# Patient Record
(demographics unavailable — no encounter records)

---

## 2024-12-12 NOTE — PLAN
[FreeTextEntry1] :   #HM  -CMP, CBC w. diff, Lipid, A1C, TSH w. rFT4, vitamin B12 level -Flu shot annually recommended -COVID-19 vaccination series and booster recommended -Tdap q10 years recommended -Use of sunscreen -Physical activity 3-5x/week for at least 30 minutes recommended -Healthy diet and lifestyle recommended including diet low in processed foods and saturated fats and high in vegetables and whole grains -Dermatology evaluation for skin cancer screening -Annual dental and vision exam recommended  Labs drawn in office

## 2024-12-12 NOTE — HEALTH RISK ASSESSMENT
[Yes] : Yes [2 - 4 times a month (2 pts)] : 2-4 times a month (2 points) [1 or 2 (0 pts)] : 1 or 2 (0 points) [Never (0 pts)] : Never (0 points) [No] : In the past 12 months have you used drugs other than those required for medical reasons? No [0] : 2) Feeling down, depressed, or hopeless: Not at all (0) [PHQ-2 Negative - No further assessment needed] : PHQ-2 Negative - No further assessment needed [Never] : Never [Patient reported mammogram was normal] : Patient reported mammogram was normal [Patient reported PAP Smear was normal] : Patient reported PAP Smear was normal [HIV test declined] : HIV test declined [Hepatitis C test declined] : Hepatitis C test declined [None] : None [Fully functional (bathing, dressing, toileting, transferring, walking, feeding)] : Fully functional (bathing, dressing, toileting, transferring, walking, feeding) [Fully functional (using the telephone, shopping, preparing meals, housekeeping, doing laundry, using] : Fully functional and needs no help or supervision to perform IADLs (using the telephone, shopping, preparing meals, housekeeping, doing laundry, using transportation, managing medications and managing finances) [Audit-CScore] : 2 [[id number]] : 0 [Change in mental status noted] : No change in mental status noted [MammogramDate] : 09/24 [PapSmearDate] : 2024

## 2025-01-10 NOTE — PHYSICAL EXAM
[de-identified] : General: patient is well developed, well nourished, in no acute  distress, alert and oriented x 3.   Mood and affect: normal  Respiratory: no respiratory distress noted  Skin: no scars over spine, skin intact, no erythema, increased warmth  Alignment:The spine is well compensated in the coronal and sagittal plane.   Gait: The patient is able to toe walk and heel walk without difficulty.   Palpation: no tenderness to palpation spine or paraspinal region  Range of motion: Lumbar spine ROM is restricted  Neurologic Exam: Motor: 2/5 right peroneal; 2/5 right EHL; 3/5 right hip abductors; 4/5 right TA, otherwise Manual Muscle testing in the lower extremities is 5 out of 5 in all muscle groups. There is no evidence of muscular atrophy in the lower extremities. Sensory: decreased sensation right L5 distribution, otherwise Sensation to light touch is grossly intact in the lower extremities  Reflexes: DTR are present and symmetric throughout, negative negrete bilaterally, negative inverted radial reflex bilaterally, no clonus, plantar responses are flexor  Hip Exam: No pain with internal or external rotation of hips bilaterally  Special tests: Straight leg raise test positive on the right.  Cross straight leg test negative.   [de-identified] : Xray 12/27/24: my read: moderate disc height loss L5/S1, mild L4/5; no instability; no fractures seen  MRI 12/28/24: my read: large extruded disc herniation paracentrally L4/5 on right with severe compression of the right thecal sac and obliteration of the right lateral recess with severe compression of the right L5 root;  L5/S1 small central disc herniation without significant neurocompressive lesion at this level

## 2025-01-10 NOTE — DISCUSSION/SUMMARY
[de-identified] : Severe weakness of right peroneal, right EHL, right hip abductor, right L5 radiculopathy in setting of L4/5 large herniated disc with severe lateral recess stenosis and severe right L5 root compression  The patient is a candidate for L4/5 microdiscectomy.  Due to severe weakness associated with this disc herniation, recommend operative treatment.   Further non operative treatment would include pain management.  Risks, benefits, alternatives were discussed with the patient at great length, including but not limited to, bleeding, infection, persistent neurologic deficit, worsening pain, worsening neurologic status, dural tear, hardware migration/failure, medical complications (including but not limited to cardiac, pulmonary, renal), and the need for further surgery.  We discussed that there is no guarantee of return to pain free status or normal neurologic function.  The patient asked a number of cogent questions.  All questions were answered.  The patient demonstrated understanding of the risks, benefits, and alternatives.  The patient has elected to proceed with surgery.

## 2025-01-10 NOTE — HISTORY OF PRESENT ILLNESS
[de-identified] : Referred by Dr. Vamshi Crockett  Ms. MARADIAGA is a very pleasant 40 year old female who complains of a long-standing history of low back and right buttock pain, worse over the past month, atraumatic onset. Pain radiates laterally down her right lower extremity to her foot. Reports numbness/tingling in right foot, worst in big toe. Worst pain is in right calf. Has been seen in ED. Has taken muscle relaxants and percocet. Now taking gabapentin and meloxicam. Daily activities significantly affected by symptoms.   The patient no history of previous spine surgery.   The patient has no history of unexpected weight loss, no history of active cancer, no history bladder or bowel dysfunction, no night pain, no fevers or chills.   The past medical history, surgical history, family history, allergies, medications, 10+ point review of systems, family history and social history were reviewed and non contributory.

## 2025-01-21 NOTE — ADDENDUM
[FreeTextEntry1] :  Pt states dyspnea resolved and attributes to anxiety regarding her back pain which she has states has worsened.  Urine cx- klebsiella- 50-99k Bactrim DS BID x 5 days recommended.

## 2025-01-21 NOTE — PHYSICAL EXAM
[No Acute Distress] : no acute distress [Well Nourished] : well nourished [Well Developed] : well developed [Well-Appearing] : well-appearing [Normal Sclera/Conjunctiva] : normal sclera/conjunctiva [PERRL] : pupils equal round and reactive to light [EOMI] : extraocular movements intact [Normal Outer Ear/Nose] : the outer ears and nose were normal in appearance [Normal Oropharynx] : the oropharynx was normal [No JVD] : no jugular venous distention [No Lymphadenopathy] : no lymphadenopathy [Supple] : supple [Thyroid Normal, No Nodules] : the thyroid was normal and there were no nodules present [No Respiratory Distress] : no respiratory distress  [No Accessory Muscle Use] : no accessory muscle use [Clear to Auscultation] : lungs were clear to auscultation bilaterally [Normal Rate] : normal rate  [Regular Rhythm] : with a regular rhythm [Normal S1, S2] : normal S1 and S2 [No Murmur] : no murmur heard [No Abdominal Bruit] : a ~M bruit was not heard ~T in the abdomen [No Varicosities] : no varicosities [Pedal Pulses Present] : the pedal pulses are present [No Edema] : there was no peripheral edema [No Palpable Aorta] : no palpable aorta [No Extremity Clubbing/Cyanosis] : no extremity clubbing/cyanosis [Soft] : abdomen soft [Non Tender] : non-tender [Non-distended] : non-distended [No Masses] : no abdominal mass palpated [No HSM] : no HSM [Normal Bowel Sounds] : normal bowel sounds [Normal Posterior Cervical Nodes] : no posterior cervical lymphadenopathy [Normal Anterior Cervical Nodes] : no anterior cervical lymphadenopathy [No CVA Tenderness] : no CVA  tenderness [No Spinal Tenderness] : no spinal tenderness [No Joint Swelling] : no joint swelling [Grossly Normal Strength/Tone] : grossly normal strength/tone [No Rash] : no rash [Coordination Grossly Intact] : coordination grossly intact [No Focal Deficits] : no focal deficits [Normal Affect] : the affect was normal [Normal Insight/Judgement] : insight and judgment were intact [de-identified] : dense breasts, nipple tenderness b/l [de-identified] : decreased sensation of right foot proximal to toes , right ankle weakness

## 2025-01-21 NOTE — PHYSICAL EXAM
[No Acute Distress] : no acute distress [Well Nourished] : well nourished [Well Developed] : well developed [Well-Appearing] : well-appearing [Normal Sclera/Conjunctiva] : normal sclera/conjunctiva [PERRL] : pupils equal round and reactive to light [EOMI] : extraocular movements intact [Normal Outer Ear/Nose] : the outer ears and nose were normal in appearance [Normal Oropharynx] : the oropharynx was normal [No JVD] : no jugular venous distention [No Lymphadenopathy] : no lymphadenopathy [Supple] : supple [Thyroid Normal, No Nodules] : the thyroid was normal and there were no nodules present [No Respiratory Distress] : no respiratory distress  [No Accessory Muscle Use] : no accessory muscle use [Clear to Auscultation] : lungs were clear to auscultation bilaterally [Normal Rate] : normal rate  [Regular Rhythm] : with a regular rhythm [Normal S1, S2] : normal S1 and S2 [No Murmur] : no murmur heard [No Abdominal Bruit] : a ~M bruit was not heard ~T in the abdomen [No Varicosities] : no varicosities [Pedal Pulses Present] : the pedal pulses are present [No Edema] : there was no peripheral edema [No Palpable Aorta] : no palpable aorta [No Extremity Clubbing/Cyanosis] : no extremity clubbing/cyanosis [Soft] : abdomen soft [Non Tender] : non-tender [Non-distended] : non-distended [No Masses] : no abdominal mass palpated [No HSM] : no HSM [Normal Bowel Sounds] : normal bowel sounds [Normal Posterior Cervical Nodes] : no posterior cervical lymphadenopathy [Normal Anterior Cervical Nodes] : no anterior cervical lymphadenopathy [No CVA Tenderness] : no CVA  tenderness [No Spinal Tenderness] : no spinal tenderness [No Joint Swelling] : no joint swelling [Grossly Normal Strength/Tone] : grossly normal strength/tone [No Rash] : no rash [Coordination Grossly Intact] : coordination grossly intact [No Focal Deficits] : no focal deficits [Normal Affect] : the affect was normal [Normal Insight/Judgement] : insight and judgment were intact [de-identified] : dense breasts, nipple tenderness b/l [de-identified] : decreased sensation of right foot proximal to toes , right ankle weakness

## 2025-01-21 NOTE — HISTORY OF PRESENT ILLNESS
[Excellent (>10 METs)] : Excellent (>10 METs) [Aortic Stenosis] : no aortic stenosis [Atrial Fibrillation] : no atrial fibrillation [Coronary Artery Disease] : no coronary artery disease [Recent Myocardial Infarction] : no recent myocardial infarction [Implantable Device/Pacemaker] : no implantable device/pacemaker [Asthma] : no asthma [COPD] : no COPD [Sleep Apnea] : no sleep apnea [Smoker] : not a smoker [Family Member] : no family member with adverse anesthesia reaction/sudden death [Self] : no previous adverse anesthesia reaction [Chronic Anticoagulation] : no chronic anticoagulation [Chronic Kidney Disease] : no chronic kidney disease [Diabetes] : no diabetes [FreeTextEntry1] : lumbar discectomy  [FreeTextEntry2] : 01/23/2025  [FreeTextEntry3] : Dr. Willie Hunter  [FreeTextEntry4] : 39 y/o female presents for preoperative examination prior to lumbar discectomy. She reports low back pain with right leg neuropathy. Pt reports dyspnea which she is unsure if related to anxiety and stress of back injury. No chest pain. No urinary/fecal incontinence and no saddle anesthesia. Patient states severe back pain was triggered on 12/16 when she sneezed. No recent fall or trauma. She went to [facility] and was given Percocet. Patient states she went to urgent care on 12/21 and had completed MRI.  She initially consulted with pain management on 12/27 and orthopedic surgeon Dr. Beck ([facility]) on 01/03/25. She had labs done at [facility] on 01/03. She sought a second opinion with orthopedic Dr. Willie Hunter on 01/10/2025.  She reports taking macrobid x 5 days and completed about one week ago for UTI.  LMP- 12/23/2024  [FreeTextEntry6] : dyspnea

## 2025-01-21 NOTE — ASSESSMENT
[High Risk Surgery - Intraperitoneal, Intrathoracic or Supringuinal Vascular Procedures] : High Risk Surgery - Intraperitoneal, Intrathoracic or Supringuinal Vascular Procedures - No (0) [Ischemic Heart Disease] : Ischemic Heart Disease - No (0) [Congestive Heart Failure] : Congestive Heart Failure - No (0) [Prior Cerebrovascular Accident or TIA] : Prior Cerebrovascular Accident or TIA - No (0) [Creatinine >= 2mg/dL (1 Point)] : Creatinine >= 2mg/dL - No (0) [Insulin-dependent Diabetic (1 Point)] : Insulin-dependent Diabetic - No (0) [0] : 0 , RCRI Class: I, Risk of Post-Op Cardiac Complications: 3.9%, 95% CI for Risk Estimate: 2.8% - 5.4% [Patient Optimized for Surgery] : Patient optimized for surgery [As per surgery] : as per surgery [FreeTextEntry4] : 39 y/o female presents for preoperative examination.

## 2025-01-21 NOTE — PHYSICAL EXAM
[No Acute Distress] : no acute distress [Well Nourished] : well nourished [Well Developed] : well developed [Well-Appearing] : well-appearing [Normal Sclera/Conjunctiva] : normal sclera/conjunctiva [PERRL] : pupils equal round and reactive to light [EOMI] : extraocular movements intact [Normal Outer Ear/Nose] : the outer ears and nose were normal in appearance [Normal Oropharynx] : the oropharynx was normal [No JVD] : no jugular venous distention [No Lymphadenopathy] : no lymphadenopathy [Supple] : supple [Thyroid Normal, No Nodules] : the thyroid was normal and there were no nodules present [No Respiratory Distress] : no respiratory distress  [No Accessory Muscle Use] : no accessory muscle use [Clear to Auscultation] : lungs were clear to auscultation bilaterally [Normal Rate] : normal rate  [Regular Rhythm] : with a regular rhythm [Normal S1, S2] : normal S1 and S2 [No Murmur] : no murmur heard [No Abdominal Bruit] : a ~M bruit was not heard ~T in the abdomen [No Varicosities] : no varicosities [Pedal Pulses Present] : the pedal pulses are present [No Edema] : there was no peripheral edema [No Palpable Aorta] : no palpable aorta [No Extremity Clubbing/Cyanosis] : no extremity clubbing/cyanosis [Soft] : abdomen soft [Non Tender] : non-tender [Non-distended] : non-distended [No Masses] : no abdominal mass palpated [No HSM] : no HSM [Normal Bowel Sounds] : normal bowel sounds [Normal Posterior Cervical Nodes] : no posterior cervical lymphadenopathy [Normal Anterior Cervical Nodes] : no anterior cervical lymphadenopathy [No CVA Tenderness] : no CVA  tenderness [No Spinal Tenderness] : no spinal tenderness [No Joint Swelling] : no joint swelling [Grossly Normal Strength/Tone] : grossly normal strength/tone [No Rash] : no rash [Coordination Grossly Intact] : coordination grossly intact [No Focal Deficits] : no focal deficits [Normal Affect] : the affect was normal [Normal Insight/Judgement] : insight and judgment were intact [de-identified] : dense breasts, nipple tenderness b/l [de-identified] : decreased sensation of right foot proximal to toes , right ankle weakness

## 2025-01-21 NOTE — PHYSICAL EXAM
[No Acute Distress] : no acute distress [Well Nourished] : well nourished [Well Developed] : well developed [Well-Appearing] : well-appearing [Normal Sclera/Conjunctiva] : normal sclera/conjunctiva [PERRL] : pupils equal round and reactive to light [EOMI] : extraocular movements intact [Normal Outer Ear/Nose] : the outer ears and nose were normal in appearance [Normal Oropharynx] : the oropharynx was normal [No JVD] : no jugular venous distention [No Lymphadenopathy] : no lymphadenopathy [Supple] : supple [Thyroid Normal, No Nodules] : the thyroid was normal and there were no nodules present [No Respiratory Distress] : no respiratory distress  [No Accessory Muscle Use] : no accessory muscle use [Clear to Auscultation] : lungs were clear to auscultation bilaterally [Normal Rate] : normal rate  [Regular Rhythm] : with a regular rhythm [Normal S1, S2] : normal S1 and S2 [No Murmur] : no murmur heard [No Abdominal Bruit] : a ~M bruit was not heard ~T in the abdomen [No Varicosities] : no varicosities [Pedal Pulses Present] : the pedal pulses are present [No Edema] : there was no peripheral edema [No Palpable Aorta] : no palpable aorta [No Extremity Clubbing/Cyanosis] : no extremity clubbing/cyanosis [Soft] : abdomen soft [Non Tender] : non-tender [Non-distended] : non-distended [No Masses] : no abdominal mass palpated [No HSM] : no HSM [Normal Bowel Sounds] : normal bowel sounds [Normal Posterior Cervical Nodes] : no posterior cervical lymphadenopathy [Normal Anterior Cervical Nodes] : no anterior cervical lymphadenopathy [No CVA Tenderness] : no CVA  tenderness [No Spinal Tenderness] : no spinal tenderness [No Joint Swelling] : no joint swelling [Grossly Normal Strength/Tone] : grossly normal strength/tone [No Rash] : no rash [Coordination Grossly Intact] : coordination grossly intact [No Focal Deficits] : no focal deficits [Normal Affect] : the affect was normal [Normal Insight/Judgement] : insight and judgment were intact [de-identified] : dense breasts, nipple tenderness b/l [de-identified] : decreased sensation of right foot proximal to toes , right ankle weakness

## 2025-01-21 NOTE — HISTORY OF PRESENT ILLNESS
[Excellent (>10 METs)] : Excellent (>10 METs) [Aortic Stenosis] : no aortic stenosis [Atrial Fibrillation] : no atrial fibrillation [Coronary Artery Disease] : no coronary artery disease [Recent Myocardial Infarction] : no recent myocardial infarction [Implantable Device/Pacemaker] : no implantable device/pacemaker [Asthma] : no asthma [COPD] : no COPD [Sleep Apnea] : no sleep apnea [Smoker] : not a smoker [Family Member] : no family member with adverse anesthesia reaction/sudden death [Self] : no previous adverse anesthesia reaction [Chronic Anticoagulation] : no chronic anticoagulation [Chronic Kidney Disease] : no chronic kidney disease [Diabetes] : no diabetes [FreeTextEntry1] : lumbar discectomy  [FreeTextEntry2] : 01/23/2025  [FreeTextEntry3] : Dr. Willie Hunter  [FreeTextEntry4] : 41 y/o female presents for preoperative examination prior to lumbar discectomy. She reports low back pain with right leg neuropathy. Pt reports dyspnea which she is unsure if related to anxiety and stress of back injury. No chest pain. No urinary/fecal incontinence and no saddle anesthesia. Patient states severe back pain was triggered on 12/16 when she sneezed. No recent fall or trauma. She went to [facility] and was given Percocet. Patient states she went to urgent care on 12/21 and had completed MRI.  She initially consulted with pain management on 12/27 and orthopedic surgeon Dr. Beck ([facility]) on 01/03/25. She had labs done at [facility] on 01/03. She sought a second opinion with orthopedic Dr. Willie Hunter on 01/10/2025.  She reports taking macrobid x 5 days and completed about one week ago for UTI.  LMP- 12/23/2024  [FreeTextEntry6] : dyspnea

## 2025-01-21 NOTE — PLAN
[FreeTextEntry1] :  #Preoperative examination -CBC w. diff, CMP, PT/INR, PTT, B-HCG -EKG done today in office -UA + micro, urine cx  Recent labs reviewed -BMP- within normal limits, Cr- 0.79, B-HCG  < 2, LFTs within normal limits, H/H- 14.3/43.4, no leukocytosis, PTT- 33.1, PT- 11.8   Breast US results d/w patient - right breast 10:00 complicated cyst vs mass 0.8 cm, left breast- 6:00-complicated cyst vs mass 0.7 cm, 8:00- complicated cyst vs mass 1.3 cm, probably benign, other possibility is cyst with superimposed fat nodule- US axilla b/l, BIRADS 3- f/u imaging in 6 months or sooner if pain, lumps or breast changes. If nipple pain persists, obtain breast imaging - 3 months follow up recommended  Mammogram 10/2024- extremely dense breasts

## 2025-01-21 NOTE — HISTORY OF PRESENT ILLNESS
[Excellent (>10 METs)] : Excellent (>10 METs) [Aortic Stenosis] : no aortic stenosis [Atrial Fibrillation] : no atrial fibrillation [Coronary Artery Disease] : no coronary artery disease [Recent Myocardial Infarction] : no recent myocardial infarction [Implantable Device/Pacemaker] : no implantable device/pacemaker [Asthma] : no asthma [COPD] : no COPD [Sleep Apnea] : no sleep apnea [Smoker] : not a smoker [Family Member] : no family member with adverse anesthesia reaction/sudden death [Self] : no previous adverse anesthesia reaction [Chronic Anticoagulation] : no chronic anticoagulation [Chronic Kidney Disease] : no chronic kidney disease [Diabetes] : no diabetes [FreeTextEntry1] : lumbar discectomy  [FreeTextEntry2] : 01/23/2025  [FreeTextEntry3] : Dr. Willie Hunter  [FreeTextEntry4] : 39 y/o female presents for preoperative examination prior to lumbar discectomy. She reports low back pain with right leg neuropathy. Pt reports dyspnea which she is unsure if related to anxiety and stress of back injury. No chest pain. No urinary/fecal incontinence and no saddle anesthesia. Patient states severe back pain was triggered on 12/16 when she sneezed. No recent fall or trauma. She went to [facility] and was given Percocet. Patient states she went to urgent care on 12/21 and had completed MRI.  She initially consulted with pain management on 12/27 and orthopedic surgeon Dr. Beck ([facility]) on 01/03/25. She had labs done at [facility] on 01/03. She sought a second opinion with orthopedic Dr. Willie Hunter on 01/10/2025.  She reports taking macrobid x 5 days and completed about one week ago for UTI.  LMP- 12/23/2024  [FreeTextEntry6] : dyspnea

## 2025-01-21 NOTE — HISTORY OF PRESENT ILLNESS
[Excellent (>10 METs)] : Excellent (>10 METs) [Aortic Stenosis] : no aortic stenosis [Atrial Fibrillation] : no atrial fibrillation [Coronary Artery Disease] : no coronary artery disease [Recent Myocardial Infarction] : no recent myocardial infarction [Implantable Device/Pacemaker] : no implantable device/pacemaker [Asthma] : no asthma [COPD] : no COPD [Sleep Apnea] : no sleep apnea [Smoker] : not a smoker [Family Member] : no family member with adverse anesthesia reaction/sudden death [Self] : no previous adverse anesthesia reaction [Chronic Anticoagulation] : no chronic anticoagulation [Chronic Kidney Disease] : no chronic kidney disease [Diabetes] : no diabetes [FreeTextEntry1] : lumbar discectomy  [FreeTextEntry2] : 01/23/2025  [FreeTextEntry3] : Dr. Willie Hunter  [FreeTextEntry4] : 39 y/o female presents for preoperative examination prior to lumbar discectomy. She reports low back pain with right leg neuropathy. Pt reports dyspnea which she is unsure if related to anxiety and stress of back injury. No chest pain. No urinary/fecal incontinence and no saddle anesthesia. Patient states severe back pain was triggered on 12/16 when she sneezed. No recent fall or trauma. She went to [facility] and was given Percocet. Patient states she went to urgent care on 12/21 and had completed MRI.  She initially consulted with pain management on 12/27 and orthopedic surgeon Dr. Beck ([facility]) on 01/03/25. She had labs done at [facility] on 01/03. She sought a second opinion with orthopedic Dr. Willie Hunter on 01/10/2025.  She reports taking macrobid x 5 days and completed about one week ago for UTI.  LMP- 12/23/2024  [FreeTextEntry6] : dyspnea

## 2025-02-12 NOTE — END OF VISIT
[FreeTextEntry3] :   This note was written by Araceli Foley PA-C, acting as a scribe for Dr. Willie Hunter. I, Dr. Willie Hunter, have read and attest that all the information, medical decision-making, and discharge instructions within are true and accurate.  I, Dr. Willie Hunter, personally performed the evaluation and management (E/M) services for this new patient. That E/M includes conducting the initial examination, assessing all conditions, and establishing the plan of care. Today, my ACP, Araceli Foley PA-C, was here to observe my evaluation and management services for this patient to be followed going forward.

## 2025-02-12 NOTE — HISTORY OF PRESENT ILLNESS
[de-identified] : Post Op 1  [de-identified] : Patient IKE MARADIAGA 40-year-old FEMALE presenting for post op 1 - 2 weeks s/p right L4/5 microdiscectomy. Reports significant relief of right lower extremity pain from preop. Low back pain improving. Still with numbness in right foot. Right lower extremity strength improved. No fever/chills/drainage from incision. [de-identified] : sylke dressing intact, no milan incisional erythema, swelling or warmth, no drainage 3/5 right EHL, 3/5 right hip abductors, 4/5 right peroneus, 4/5 right TA (improved from preop), otherwise 5/5 L2-S1 b/l decreased sensation right L5   [de-identified] : No new images [de-identified] : 4 weeks post op with improvement in neurologic syndrome from preop. No sign of infection. [de-identified] : Wound and activity instructions given Follow up in 2 weeks for wound check, sooner if there is an issue XR AP/lateral lumbar at that time All questions answered

## 2025-03-07 NOTE — HISTORY OF PRESENT ILLNESS
[de-identified] : Post Op visit - Wound Check [de-identified] : Patient IKE MARADIAGA 40-year-old FEMALE presenting for post op 27 days s/p right L4/5 microdiscectomy.  Reports significant relief of right lower extremity pain from preop. Low back pain improving. Still with numbness in right foot. Right lower extremity strength improved. No fever/chills/drainage from incision. [de-identified] : Well healing incision, no erythema, drainage or warmth 4/5 right EHL, 5-/5 right peroneus, 5-/5 right TA (improved from preop and from last visit), otherwise 5/5 L2-S1 b/l decreased sensation right L5. [de-identified] : No new images [de-identified] : 4 weeks post op with improvement in neurologic syndrome from preop. No sign of infection. [de-identified] : Wound and activity instructions given Follow up in 3 weeks, sooner if there is an issue XR AP/lateral lumbar at that time All questions answered

## 2025-03-07 NOTE — ADDENDUM
[FreeTextEntry1] : Addendum 3/7/25: Patient remains 100% disabled from work at this time with an estimated npnlgd-pi-qtot date of 5/1/25.

## 2025-03-07 NOTE — HISTORY OF PRESENT ILLNESS
[de-identified] : Post Op visit - Wound Check [de-identified] : Patient IKE MARADIAGA 40-year-old FEMALE presenting for post op 27 days s/p right L4/5 microdiscectomy.  Reports significant relief of right lower extremity pain from preop. Low back pain improving. Still with numbness in right foot. Right lower extremity strength improved. No fever/chills/drainage from incision. [de-identified] : Well healing incision, no erythema, drainage or warmth 4/5 right EHL, 5-/5 right peroneus, 5-/5 right TA (improved from preop and from last visit), otherwise 5/5 L2-S1 b/l decreased sensation right L5. [de-identified] : No new images [de-identified] : 4 weeks post op with improvement in neurologic syndrome from preop. No sign of infection. [de-identified] : Wound and activity instructions given Follow up in 3 weeks, sooner if there is an issue XR AP/lateral lumbar at that time All questions answered

## 2025-03-07 NOTE — ADDENDUM
[FreeTextEntry1] : Addendum 3/7/25: Patient remains 100% disabled from work at this time with an estimated ppengl-nx-ncoe date of 5/1/25.

## 2025-03-18 NOTE — HISTORY OF PRESENT ILLNESS
[de-identified] : Post Op Visit  [de-identified] : Patient IKE MARADIAGA 40-year-old FEMALE presenting for post op [1 month, 19 days] s/p right L4/5 microdiscectomy. Doing well. Reports resolution of lower extremity pain from preop. Low back pain mild/moderate, improving. No fever/chills/drainage from incision. [de-identified] : Well healed incision 4/5 right EHL, (improved from preop and from last visit), otherwise 5/5 L2-S1 b/l decreased sensation right L5.    [de-identified] : XR 03/18/2025 (my read): alignment unchanged from preop [de-identified] : 6 weeks post op with improvement in neurologic syndrome from preop. No sign of infection. [de-identified] : Wound and activity instructions given Follow up in 6 weeks, sooner if there is an issue Ok for TEB if patient wishes All questions answered

## 2025-03-18 NOTE — HISTORY OF PRESENT ILLNESS
[de-identified] : Post Op Visit  [de-identified] : Patient IKE MARADIAGA 40-year-old FEMALE presenting for post op [1 month, 19 days] s/p right L4/5 microdiscectomy. Doing well. Reports resolution of lower extremity pain from preop. Low back pain mild/moderate, improving. No fever/chills/drainage from incision. [de-identified] : Well healed incision 4/5 right EHL, (improved from preop and from last visit), otherwise 5/5 L2-S1 b/l decreased sensation right L5.    [de-identified] : XR 03/18/2025 (my read): alignment unchanged from preop [de-identified] : 6 weeks post op with improvement in neurologic syndrome from preop. No sign of infection. [de-identified] : Wound and activity instructions given Follow up in 6 weeks, sooner if there is an issue Ok for TEB if patient wishes All questions answered

## 2025-05-12 NOTE — HISTORY OF PRESENT ILLNESS
[___ Months Post Op] : [unfilled] months post op [de-identified] : s/p microdiscectomy.  doing well, progressing, strength improving.  doing her own exercise and stretching.  still with some symptoms, though improved from preop. [de-identified] : limited by telehealth [de-identified] : none [de-identified] : 3 months post op [de-identified] : start PT activity restrictions discussed f/u in 3 months telehealth or in person as needed